# Patient Record
Sex: MALE
[De-identification: names, ages, dates, MRNs, and addresses within clinical notes are randomized per-mention and may not be internally consistent; named-entity substitution may affect disease eponyms.]

---

## 2021-07-26 PROBLEM — Z00.00 ENCOUNTER FOR PREVENTIVE HEALTH EXAMINATION: Status: ACTIVE | Noted: 2021-07-26

## 2021-08-12 ENCOUNTER — APPOINTMENT (OUTPATIENT)
Dept: UROLOGY | Facility: CLINIC | Age: 26
End: 2021-08-12
Payer: SELF-PAY

## 2021-08-12 ENCOUNTER — APPOINTMENT (OUTPATIENT)
Dept: UROLOGY | Facility: CLINIC | Age: 26
End: 2021-08-12

## 2021-08-12 DIAGNOSIS — N52.9 MALE ERECTILE DYSFUNCTION, UNSPECIFIED: ICD-10-CM

## 2021-08-12 PROCEDURE — 99204 OFFICE O/P NEW MOD 45 MIN: CPT | Mod: 95

## 2021-09-07 ENCOUNTER — APPOINTMENT (OUTPATIENT)
Dept: UROLOGY | Facility: CLINIC | Age: 26
End: 2021-09-07

## 2021-09-13 PROBLEM — N52.9 MALE ERECTILE DISORDER OF ORGANIC ORIGIN: Status: ACTIVE | Noted: 2021-09-13

## 2021-09-13 NOTE — ASSESSMENT
[FreeTextEntry1] : Needs CT angiogram review first to determine if significant target for vascular reconstruction/TIERNEY stripping exist \par - I need to see DICOM images to be able to reconstruct them \par - does not want to try Cialis sandwich therapy as he believes he failed it in the past \par \par - needs quadmix send \par - needs stop smoking \par - insurance may be an issue \par \par Pt will send me usb drive with images or CD/DVD with angiogram to review\par \par Discussed that I will not schedule any vascular recon unless he shows lack of response to ICI wih quad mix\par \par He needs to stop smoking for at least 3 months before making decision about surgery \par \par \par Total time 45 min \par 20 min discussion\par 25 min review of PMH, writing script and coordination of care\par \par The submitted E/M billing level for this visit reflects the total time spent on the day of the visit including documentation in EMR, face-to-face time spent with the patient, non-face-to-face review of medical records and relevant information, review of laboratory results available via [facility] portal, as well using a patient’s electronic medical records portal for patients with records not available to [facility] directly. \par \par Time spend counseling and performing coordination of care was also included in determining the appropriate EM billing level.\par \par I have reviewed and verified information regarding the chief complaint and history recorded by the ancillary staff and/or the patient. I have independently reviewed and interpreted tests performed by other physicians and facilities as necessary. I have reviewed images pertinent to providing the care to  the patient.  \par \par I have discussed with the patient differential diagnosis, reason for auxiliary tests if ordered, risks, benefits, alternatives, and complications of each form of therapy included surgical therapy. Off label use of most of medications used in andrology was reviewed. \par \par \par Patient familiar with injection technique and risks of priapism will start at 10 units \par if erection lasting longer than 2 h needs to take sudafed and go to ER if erection persists\par

## 2021-09-13 NOTE — HISTORY OF PRESENT ILLNESS
[Other Location: e.g. School (Enter Location, City,State)___] : at [unfilled], at the time of the visit. [Medical Office: ([facility])___] : at the medical office located in  [Verbal consent obtained from patient] : the patient, [unfilled] [FreeTextEntry1] : The patient-doctor relationship has been established in a face to face fashion via real time video/audio HIPAA compliant communication using telemedicine software.  \par The “patient” means “patient” or “parent” or “authorize representative” for purpose of consent. \par The patient's identity has been confirmed verifying available demographic data.\par The patient was previously emailed a copy of the telemedicine consent.  The patient has had a chance to review and has now given verbal consent and has requested care to be assessed and treated through telemedicine. Patient understands there maybe limitations in this process, and they may need further follow up care in the office and or hospital settings.   \par  \par \par Here for ED \par venous leak diagnosis \par not responding to medication \par \par was seen in [facility] by Dr. Otto \par - diagnosed with possible venous leak \par - interested in vascular reconstructive surgery \par - erection is  good for masturbation \par - not adequate for penetration \par - heard some pop when was masturbating \par - no pain during erection \par \par when he is on his back the erection is better\par \par has not tried injections or Cialis  on ongoing basis to measure response to non- surgical therapies \par \par pt is a smoker \par